# Patient Record
Sex: FEMALE | URBAN - METROPOLITAN AREA
[De-identification: names, ages, dates, MRNs, and addresses within clinical notes are randomized per-mention and may not be internally consistent; named-entity substitution may affect disease eponyms.]

---

## 2023-02-22 ENCOUNTER — APPOINTMENT (RX ONLY)
Dept: URBAN - METROPOLITAN AREA CLINIC 41 | Facility: CLINIC | Age: 78
Setting detail: DERMATOLOGY
End: 2023-02-22

## 2023-02-22 DIAGNOSIS — L30.9 DERMATITIS, UNSPECIFIED: ICD-10-CM | Status: INADEQUATELY CONTROLLED

## 2023-02-22 PROCEDURE — 99204 OFFICE O/P NEW MOD 45 MIN: CPT | Mod: 25

## 2023-02-22 PROCEDURE — ? COUNSELING

## 2023-02-22 PROCEDURE — ? PRESCRIPTION

## 2023-02-22 PROCEDURE — ? BIOPSY BY PUNCH METHOD

## 2023-02-22 PROCEDURE — ? TREATMENT REGIMEN

## 2023-02-22 PROCEDURE — ? SEPARATE AND IDENTIFIABLE DOCUMENTATION

## 2023-02-22 PROCEDURE — 11104 PUNCH BX SKIN SINGLE LESION: CPT

## 2023-02-22 RX ORDER — CLOBETASOL PROPIONATE 0.5 MG/G
1 CREAM TOPICAL QD
Qty: 60 | Refills: 2 | Status: ERX | COMMUNITY
Start: 2023-02-22

## 2023-02-22 RX ADMIN — CLOBETASOL PROPIONATE 1: 0.5 CREAM TOPICAL at 00:00

## 2023-02-22 ASSESSMENT — LOCATION DETAILED DESCRIPTION DERM
LOCATION DETAILED: LEFT MEDIAL SUPERIOR CHEST
LOCATION DETAILED: LEFT DISTAL DORSAL FOREARM
LOCATION DETAILED: RIGHT DISTAL DORSAL FOREARM

## 2023-02-22 ASSESSMENT — LOCATION ZONE DERM
LOCATION ZONE: TRUNK
LOCATION ZONE: ARM

## 2023-02-22 ASSESSMENT — LOCATION SIMPLE DESCRIPTION DERM
LOCATION SIMPLE: LEFT FOREARM
LOCATION SIMPLE: RIGHT FOREARM
LOCATION SIMPLE: CHEST

## 2023-02-22 ASSESSMENT — ITCH NUMERIC RATING SCALE: HOW SEVERE IS YOUR ITCHING?: 4

## 2023-02-22 NOTE — PROCEDURE: BIOPSY BY PUNCH METHOD
Detail Level: Detailed
Was A Bandage Applied: Yes
Punch Size In Mm: 3.5
Size Of Lesion In Cm (Optional): 0
Depth Of Punch Biopsy: dermis
Biopsy Type: H and E
Anesthesia Type: 1% lidocaine with epinephrine
Anesthesia Volume In Cc: 2
Hemostasis: Pressure
Epidermal Sutures: 4-0 Nylon
Wound Care: Vaseline
Dressing: Band-Aid
Suture Removal: 12 days
Patient Will Remove Sutures At Home?: No
Lab: [id number]
Consent was obtained and risks were reviewed including but not limited to scarring, infection, bleeding, scabbing, incomplete removal, nerve damage and allergy to anesthesia.
Post-Care Instructions: I reviewed with the patient in detail post-care instructions. Patient is to keep the biopsy site dry overnight, and then apply bacitracin twice daily until healed. Patient may apply hydrogen peroxide soaks to remove any crusting.
Home Suture Removal Text: Patient was provided a home suture removal kit and will remove their sutures at home.  If they have any questions or difficulties they will call the office.
Notification Instructions: Patient will be notified of biopsy results. However, patient instructed to call the office if not contacted within 2 weeks.
Billing Type: Third-Party Bill
Information: Selecting Yes will display possible errors in your note based on the variables you have selected. This validation is only offered as a suggestion for you. PLEASE NOTE THAT THE VALIDATION TEXT WILL BE REMOVED WHEN YOU FINALIZE YOUR NOTE. IF YOU WANT TO FAX A PRELIMINARY NOTE YOU WILL NEED TO TOGGLE THIS TO 'NO' IF YOU DO NOT WANT IT IN YOUR FAXED NOTE.

## 2023-02-22 NOTE — HPI: RASH
What Type Of Note Output Would You Prefer (Optional)?: Standard Output
Is The Patient Presenting As Previously Scheduled?: Yes
How Severe Is Your Rash?: mild
Is This A New Presentation, Or A Follow-Up?: Referral for Rash
Who Is Your Referring Provider?: Irene Hanna

## 2023-03-07 ENCOUNTER — APPOINTMENT (RX ONLY)
Dept: URBAN - METROPOLITAN AREA CLINIC 41 | Facility: CLINIC | Age: 78
Setting detail: DERMATOLOGY
End: 2023-03-07

## 2023-03-07 DIAGNOSIS — L30.8 OTHER SPECIFIED DERMATITIS: ICD-10-CM

## 2023-03-07 PROCEDURE — ? ORDER TESTS

## 2023-03-08 ENCOUNTER — APPOINTMENT (RX ONLY)
Dept: URBAN - METROPOLITAN AREA CLINIC 41 | Facility: CLINIC | Age: 78
Setting detail: DERMATOLOGY
End: 2023-03-08

## 2023-03-08 DIAGNOSIS — L30.8 OTHER SPECIFIED DERMATITIS: ICD-10-CM | Status: INADEQUATELY CONTROLLED

## 2023-03-08 PROCEDURE — ? TREATMENT REGIMEN

## 2023-03-08 PROCEDURE — ? COUNSELING

## 2023-03-08 PROCEDURE — ? ORDER TESTS

## 2023-03-08 PROCEDURE — 99214 OFFICE O/P EST MOD 30 MIN: CPT

## 2023-03-08 ASSESSMENT — LOCATION DETAILED DESCRIPTION DERM
LOCATION DETAILED: RIGHT DISTAL DORSAL FOREARM
LOCATION DETAILED: LEFT PROXIMAL DORSAL FOREARM

## 2023-03-08 ASSESSMENT — LOCATION SIMPLE DESCRIPTION DERM
LOCATION SIMPLE: RIGHT FOREARM
LOCATION SIMPLE: LEFT FOREARM

## 2023-03-08 ASSESSMENT — LOCATION ZONE DERM: LOCATION ZONE: ARM

## 2023-03-08 NOTE — PROCEDURE: ORDER TESTS
Bill For Surgical Tray: no
Performing Laboratory: 0
Expected Date Of Service: 03/08/2023
Billing Type: Third-Party Bill

## 2023-04-13 ENCOUNTER — RX ONLY (OUTPATIENT)
Age: 78
Setting detail: RX ONLY
End: 2023-04-13

## 2023-04-13 RX ORDER — HYDROXYCHLOROQUINE SULFATE 200 MG/1
1 TABLET, FILM COATED ORAL BID
Qty: 60 | Refills: 3 | Status: ERX | COMMUNITY
Start: 2023-04-13

## 2023-05-31 ENCOUNTER — APPOINTMENT (RX ONLY)
Dept: URBAN - METROPOLITAN AREA CLINIC 41 | Facility: CLINIC | Age: 78
Setting detail: DERMATOLOGY
End: 2023-05-31

## 2023-05-31 DIAGNOSIS — L93.1 SUBACUTE CUTANEOUS LUPUS ERYTHEMATOSUS: ICD-10-CM | Status: IMPROVED

## 2023-05-31 PROCEDURE — 99214 OFFICE O/P EST MOD 30 MIN: CPT

## 2023-05-31 PROCEDURE — ? PRESCRIPTION

## 2023-05-31 PROCEDURE — ? COUNSELING

## 2023-05-31 PROCEDURE — ? TREATMENT REGIMEN

## 2023-05-31 RX ORDER — HYDROXYCHLOROQUINE SULFATE 200 MG/1
1 TABLET, FILM COATED ORAL BID
Qty: 60 | Refills: 11 | Status: ERX

## 2023-05-31 ASSESSMENT — LOCATION SIMPLE DESCRIPTION DERM
LOCATION SIMPLE: LEFT FOREARM
LOCATION SIMPLE: LEFT THIGH

## 2023-05-31 ASSESSMENT — LOCATION ZONE DERM
LOCATION ZONE: LEG
LOCATION ZONE: ARM

## 2023-05-31 ASSESSMENT — LOCATION DETAILED DESCRIPTION DERM
LOCATION DETAILED: LEFT ANTERIOR DISTAL THIGH
LOCATION DETAILED: LEFT DISTAL DORSAL FOREARM

## 2023-05-31 NOTE — PROCEDURE: TREATMENT REGIMEN
Continue Regimen: Hydroxychloroquine 200mg BID, clobetasol BID
Plan: Patient will call if needs refills of clobetasol cream
Detail Level: Detailed

## 2023-09-20 ENCOUNTER — APPOINTMENT (RX ONLY)
Dept: URBAN - METROPOLITAN AREA CLINIC 41 | Facility: CLINIC | Age: 78
Setting detail: DERMATOLOGY
End: 2023-09-20

## 2023-09-20 DIAGNOSIS — L93.1 SUBACUTE CUTANEOUS LUPUS ERYTHEMATOSUS: ICD-10-CM | Status: WELL CONTROLLED

## 2023-09-20 PROCEDURE — ? COUNSELING

## 2023-09-20 PROCEDURE — ? TREATMENT REGIMEN

## 2023-09-20 PROCEDURE — 99213 OFFICE O/P EST LOW 20 MIN: CPT

## 2023-09-20 ASSESSMENT — LOCATION SIMPLE DESCRIPTION DERM: LOCATION SIMPLE: LEFT FOREARM

## 2023-09-20 ASSESSMENT — LOCATION DETAILED DESCRIPTION DERM: LOCATION DETAILED: LEFT VENTRAL PROXIMAL FOREARM

## 2023-09-20 ASSESSMENT — LOCATION ZONE DERM: LOCATION ZONE: ARM

## 2023-09-20 NOTE — PROCEDURE: TREATMENT REGIMEN
Samples Given: Heliocare
Continue Regimen: Clobetasol cream bid when flares, hydroxychloroquine 200mg bid
Detail Level: Simple

## 2024-03-20 ENCOUNTER — APPOINTMENT (RX ONLY)
Dept: URBAN - METROPOLITAN AREA CLINIC 41 | Facility: CLINIC | Age: 79
Setting detail: DERMATOLOGY
End: 2024-03-20

## 2024-03-20 DIAGNOSIS — L93.1 SUBACUTE CUTANEOUS LUPUS ERYTHEMATOSUS: ICD-10-CM | Status: IMPROVED

## 2024-03-20 DIAGNOSIS — L81.7 PIGMENTED PURPURIC DERMATOSIS: ICD-10-CM

## 2024-03-20 PROCEDURE — ? PRESCRIPTION

## 2024-03-20 PROCEDURE — ? TREATMENT REGIMEN

## 2024-03-20 PROCEDURE — ? COUNSELING

## 2024-03-20 PROCEDURE — 99213 OFFICE O/P EST LOW 20 MIN: CPT

## 2024-03-20 RX ORDER — HYDROXYCHLOROQUINE SULFATE 200 MG/1
1 TABLET, FILM COATED ORAL BID
Qty: 60 | Refills: 6 | Status: ERX

## 2024-03-20 RX ORDER — CLOBETASOL PROPIONATE 0.5 MG/G
1 CREAM TOPICAL BID
Qty: 60 | Refills: 6 | Status: ERX

## 2024-03-20 ASSESSMENT — LOCATION SIMPLE DESCRIPTION DERM
LOCATION SIMPLE: LEFT PRETIBIAL REGION
LOCATION SIMPLE: LEFT FOREARM

## 2024-03-20 ASSESSMENT — LOCATION DETAILED DESCRIPTION DERM
LOCATION DETAILED: LEFT DISTAL PRETIBIAL REGION
LOCATION DETAILED: LEFT VENTRAL PROXIMAL FOREARM

## 2024-03-20 ASSESSMENT — LOCATION ZONE DERM
LOCATION ZONE: ARM
LOCATION ZONE: LEG

## 2024-03-20 NOTE — PROCEDURE: TREATMENT REGIMEN
Continue Regimen: Clobetasol cream bid when flares, hydroxychloroquine 200mg bid
Detail Level: Simple
Initiate Treatment: Topical steroids when it starts

## 2024-09-18 ENCOUNTER — APPOINTMENT (RX ONLY)
Dept: URBAN - METROPOLITAN AREA CLINIC 41 | Facility: CLINIC | Age: 79
Setting detail: DERMATOLOGY
End: 2024-09-18

## 2024-09-18 DIAGNOSIS — L93.1 SUBACUTE CUTANEOUS LUPUS ERYTHEMATOSUS: ICD-10-CM

## 2024-09-18 PROCEDURE — ? PRESCRIPTION

## 2024-09-18 PROCEDURE — ? TREATMENT REGIMEN

## 2024-09-18 PROCEDURE — 99213 OFFICE O/P EST LOW 20 MIN: CPT

## 2024-09-18 PROCEDURE — ? COUNSELING

## 2024-09-18 RX ORDER — CLOBETASOL PROPIONATE 0.5 MG/G
1 CREAM TOPICAL BID
Qty: 60 | Refills: 6 | Status: ERX

## 2024-09-18 RX ORDER — HYDROXYCHLOROQUINE SULFATE 200 MG/1
1 TABLET, FILM COATED ORAL BID
Qty: 60 | Refills: 6 | Status: ERX

## 2024-09-18 ASSESSMENT — LOCATION DETAILED DESCRIPTION DERM: LOCATION DETAILED: LEFT VENTRAL PROXIMAL FOREARM

## 2024-09-18 ASSESSMENT — LOCATION ZONE DERM: LOCATION ZONE: ARM

## 2024-09-18 ASSESSMENT — LOCATION SIMPLE DESCRIPTION DERM: LOCATION SIMPLE: LEFT FOREARM

## 2024-09-18 NOTE — PROCEDURE: TREATMENT REGIMEN
Continue Regimen: Clobetasol cream bid when flares, hydroxychloroquine 200mg bid
Detail Level: Simple

## 2025-03-19 ENCOUNTER — APPOINTMENT (OUTPATIENT)
Dept: URBAN - METROPOLITAN AREA CLINIC 41 | Facility: CLINIC | Age: 80
Setting detail: DERMATOLOGY
End: 2025-03-19

## 2025-03-19 DIAGNOSIS — L93.1 SUBACUTE CUTANEOUS LUPUS ERYTHEMATOSUS: ICD-10-CM | Status: IMPROVED

## 2025-03-19 PROCEDURE — ? COUNSELING

## 2025-03-19 PROCEDURE — ? TREATMENT REGIMEN

## 2025-03-19 PROCEDURE — ? PRESCRIPTION

## 2025-03-19 PROCEDURE — 99214 OFFICE O/P EST MOD 30 MIN: CPT

## 2025-03-19 RX ORDER — CLOBETASOL PROPIONATE 0.5 MG/G
CREAM TOPICAL BID
Qty: 60 | Refills: 11 | Status: ERX

## 2025-03-19 RX ORDER — HYDROXYCHLOROQUINE SULFATE 200 MG/1
TABLET, FILM COATED ORAL BID
Qty: 60 | Refills: 11 | Status: ERX

## 2025-03-19 ASSESSMENT — LOCATION ZONE DERM: LOCATION ZONE: ARM

## 2025-03-19 ASSESSMENT — LOCATION DETAILED DESCRIPTION DERM: LOCATION DETAILED: LEFT VENTRAL PROXIMAL FOREARM

## 2025-03-19 ASSESSMENT — LOCATION SIMPLE DESCRIPTION DERM: LOCATION SIMPLE: LEFT FOREARM

## 2025-03-19 NOTE — PROCEDURE: TREATMENT REGIMEN
Continue Regimen: Clobetasol cream bid until clear for flares\\nhydroxychloroquine 200mg bid
Detail Level: Simple